# Patient Record
Sex: FEMALE | Race: WHITE | NOT HISPANIC OR LATINO | URBAN - METROPOLITAN AREA
[De-identification: names, ages, dates, MRNs, and addresses within clinical notes are randomized per-mention and may not be internally consistent; named-entity substitution may affect disease eponyms.]

---

## 2024-10-29 ENCOUNTER — CLINICAL SUPPORT (OUTPATIENT)
Dept: AUDIOLOGY | Facility: CLINIC | Age: 24
End: 2024-10-29
Payer: COMMERCIAL

## 2024-10-29 ENCOUNTER — APPOINTMENT (OUTPATIENT)
Dept: OTOLARYNGOLOGY | Facility: CLINIC | Age: 24
End: 2024-10-29
Payer: COMMERCIAL

## 2024-10-29 VITALS — HEIGHT: 63 IN | WEIGHT: 130 LBS | BODY MASS INDEX: 23.04 KG/M2

## 2024-10-29 DIAGNOSIS — R42 DIZZINESS: Primary | ICD-10-CM

## 2024-10-29 DIAGNOSIS — R42 DIZZINESS AND GIDDINESS: Primary | ICD-10-CM

## 2024-10-29 DIAGNOSIS — H93.13 TINNITUS OF BOTH EARS: ICD-10-CM

## 2024-10-29 PROCEDURE — 92550 TYMPANOMETRY & REFLEX THRESH: CPT | Performed by: AUDIOLOGIST

## 2024-10-29 PROCEDURE — 92557 COMPREHENSIVE HEARING TEST: CPT | Performed by: AUDIOLOGIST

## 2024-10-29 PROCEDURE — 3008F BODY MASS INDEX DOCD: CPT | Performed by: PHYSICIAN ASSISTANT

## 2024-10-29 PROCEDURE — 1036F TOBACCO NON-USER: CPT | Performed by: PHYSICIAN ASSISTANT

## 2024-10-29 PROCEDURE — 99203 OFFICE O/P NEW LOW 30 MIN: CPT | Performed by: PHYSICIAN ASSISTANT

## 2024-10-29 RX ORDER — OLANZAPINE 2.5 MG/1
TABLET ORAL
COMMUNITY
Start: 2024-10-16

## 2024-10-29 RX ORDER — FLUOXETINE 10 MG/1
20 CAPSULE ORAL DAILY
COMMUNITY
Start: 2024-10-15

## 2024-10-29 RX ORDER — OMEPRAZOLE 40 MG/1
CAPSULE, DELAYED RELEASE ORAL
COMMUNITY
Start: 2024-10-25

## 2024-10-29 RX ORDER — PROMETHAZINE HYDROCHLORIDE 12.5 MG/1
TABLET ORAL
COMMUNITY
Start: 2024-10-28

## 2024-11-13 NOTE — PROGRESS NOTES
ADULT BALANCE FUNCTION TEST (BFT)      Name:  Abigail Martínez  :  2000  Age:  24 y.o.  Date of Evaluation:  2024    IMPRESSIONS     Overall normal vestibular examination; no evidence of active vestibular system involvement to account for patient reported symptoms. There were no indications of peripheral or central vestibular system pathway involvement. There were no indications of active Benign Paroxysmal Positional Vertigo (BPPV) present. However, patient is at risk of falling based on previous history of imbalance with associated falls.     RECOMMENDATIONS     Consider vestibular physical therapy to address reported history of imbalance with associated falls. Patient would be an appropriate candidate for fall risk prevention.  Consider re-evaluation as medically indicated.  Maintain a healthy lifestyle to help body function overall.  Continue monitoring per ENT/PCP preference.    Time: 9704-9802    HISTORY     Patient was seen for Balance Function Testing (BFT) due to a history of dizziness/imbalance. Vestibular case history collected via patient-clinician interview, patient chart review, and patient questionnaires.    Patient reported history of dizziness described as vertigo/spinning.  Symptoms began roughly two years ago. Notes might have had similar symptoms in the past, however they occurred sporadically and were less consistent in nature.  Episodes occur daily and last several minutes (10-15) before subsiding.  Associated symptoms include imbalance (veering from side to side), swaying, rocking sensation, and intermittent lightheadedness.  Symptoms are provoked by standing quickly, rolling over in bed, quick head turns, and quick changes in vision (glancing side to side).  Symptoms are unable to be alleviated. Patient has utilized Meclizine without improvement in symptoms.  Overall the patient's symptoms have increased in severity over time. Of note, patient has participated in vestibular  "physical therapy with focus on gaze stability and gait/balance exercises (most recent session one year ago, attended for several months). Patient did not perceive improvement in symptoms following treatment.   This patient has had a several falls due to their symptoms with several concussions.  Denied any symptoms provoked by sneezing or coughing, as well as any presence of autophony.   Recent medication changes include phychiatric medications.  Relevant medical history includes intermittent tinnitus and Polycystic Ovary Syndrome (PCOS). Consistent left leg numbness since July 2024.  It should be noted patient attended today's evaluation from an inpatient facility for phychiatric disorders. She is currently under treatment at [facility].  Most recent audiologic evaluation performed on 10/29/2024 by Shirin Benedict CCC-MAX revealed normal hearing sensitivity, bilaterally. Tympanometry revealed normal eardrum mobility and canal volume, bilaterally.  Patient reported complying with pre-test instructions.     EVALUATION     See VNG, vHIT, & VEMP Raw Data in \"Media\"    TEST RESULTS     BEDSIDE ASSESSMENT TEST  The bedside assessment is an optional portion of the test battery to further assist in differential diagnosis and screen for eye abnormalities which may affect testing.    Otoscopy: clear ear canals.  Extra-Ocular Range of Motion: normal.Extra-Ocular Range of Motion is performed to evaluate any eye abnormalities prior to testing.  Cover/Uncover: normal. Cover/Uncover test is performed to evaluate for skewed deviation of the eyes prior to testing.  Cervical Neck Exam: normal. Cervical Neck is performed to evaluate any restrictions or pain with neck movement prior to testing.  Vertebral Artery Screen: normal. Vertebral Artery Screen is performed to evaluate for vertebrobasilar insufficiency prior to testing.   Ocular Counter-Roll: normal. Ocular Counter-Roll is performed to evaluate ocular tilt reaction and " assess otolith organ function prior to testing.  VOR Cancellation: normal. VOR Cancellation is performed to evaluate fixation suppression prior to testing.  Modified Clinical Test of Sensory Interaction on Balance (mCTSIB): deferred on this date due to parent reported concerns of falls. mCTSIB is performed to evaluate balance with varying sensory information.      VIDEONYSTAGMOGRAPHY (VNG) TEST  VNG provides objective indications of peripheral and central vestibulo-ocular pathway involvement. Ocular motor testing to visually guided targets is conducted using a dual channel video-recording technique for the recording of eye movement in the horizontal and vertical planes. Air caloric testing is performed at 48 degrees C and 24 degrees C.    Spontaneous Nystagmus test was absent. Spontaneous nystagmus testing may help with the identification of an acute or uncompensated peripheral vestibular lesion.   Gaze Nystagmus test was normal. Gaze nystagmus testing is to evaluate for nystagmus that is evoked by holding eye gaze in any particular direction. True gaze nystagmus is amplified when vision is denied.   Random Saccades test was normal. Random saccade testing is to evaluate patient's ability to make fast random eye movements along a horizontal moving target.   Smooth Pursuit/Tracking test was normal. Smooth pursuit/tracking testing is to evaluate the ability to move eyes with a single smoothly moving target.   Optokinetic nystagmus testing was normal. This full-field OPK test is to evaluate the ability of central nervous system to stabilize vision during sustained head movement after the VOR system loses effectiveness.   Clarkston-Hallpike testing was normal. Zion-Hallpike testing is to provide a diagnosis of Benign Paroxysmal Positional Vertigo (BPPV) of the vertical semicircular canals on the side which is most affected.  Roll testing was normal. Roll testing is to provide a diagnosis of Benign Paroxysmal Positional Vertigo  (BPPV) of the horizontal semicircular canals on the side which is most affected.  Positional testing was normal. Positional testing is to evaluate patient's ability to hold a steady gaze while in different positions.  Monothermal caloric testing was normal. Unilateral weakness of 6% to the right which is normal. Caloric testing is to evaluate for peripheral vestibular lesion.    NOTE: monothermal caloric testing is indicated when:  slow phase velocity of the nystagmus is 8 degrees/second or greater  less than 15% difference in the degree of nystagmus between the ears  spontaneous or positional nystagmus observed during testing is less than 5 degrees/second      VIDEO HEAD IMPULSE TEST (vHIT)  The vHIT procedure provides objective assessment of the high frequency vestibulo-ocular reflex (VOR) for each semicircular canal. Rapid, random horizontal and vertical thrusts are applied to the patient's head to provoke the VOR. The vHIT procedure includes two separate paradigms: Head Impulse Paradigm (HIMP) and Suppression Head Impulse Paradigm (SHIMP). SHIMP is an optional paradigm that is not appropriate to perform for every patient. However, it is appropriate to perform SHIMP when there is verified evidence of possible vestibulopathy in the traditional HIMP test.     Head Impulse Paradigm (HIMP)   Right Ear   Canal Gain Overt Saccades Covert Saccades   Lateral 0.84 absent absent   Anterior 0.92 absent absent   Posterior 0.74 absent absent        Head Impulse Paradigm (HIMP)   Left Ear   Canal Gain Overt Saccades Covert Saccades   Lateral 0.85 absent absent   Anterior 0.80 absent absent   Posterior 0.80 absent absent     Total gain for all canals tested was within normal limits  (<0.80 is abnormal for lateral, <0.70 is abnormal for vertical).  There was no evidence of overt or covert saccades throughout testing.      CERVICAL VESTIBULAR EVOKED MYOGENIC POTENTIALS (cVEMP)  The cVEMP procedure is an evoked potential used to  test the saccule and its afferent pathway. An asymmetry ratio is utilized to determine side of lesion. The cVEMP was recorded with the patient cervical extension to produce isolated contraction of the ipsilateral sternocleidomastoid (SCM) muscle. The cVEMP was recorded using a 500 Hz tone burst or 1000 Hz tone burst at a rate of 5.1.      Ear Presentation Level Amplitude P1 Latency N1 Latency  Amplitude Asymmetry Ratio   Right 95 dB nHL 53.03 µV 16.00 ms 23.00 ms 21%   Left 95 dB nHL 80.79 µV 14.00 ms 22.00 ms       Superior Canal Dehiscence Screening (75 dB nHL): Negative bilaterally.    Replicable cVEMP responses were within normal limits, bilaterally. The amplitude asymmetry ratio of 21% was not significant (>33% = abnormal)        OCULAR VESTIBULAR EVOKED MYOGENIC POTENTIALS (oVEMP)  The oVEMP procedure is an evoked potential used to test the utricle and its afferent pathway. An asymmetry ratio is utilized to determine side of lesion. This is a contralateral recording. The oVEMP was recorded with the patient seated upright with eyes tilted upward to produce isolated contraction of the contralateral inferior oblique muscle. The oVEMP were recorded using a 500 Hz, 1000 Hz, 4000 Hz tone burst at a rate of 5.1.       Ear Presentation Level Amplitude N1 Latency  P1 Latency  Amplitude Asymmetry Ratio   Right 105 dB nHL 11.49 µV 10.67 ms 17.33 ms Could not calculate due to threshold difference.   Left 95 dB nHL 12.06 µV 10.67 ms 15.33 ms       Meniere's Disease Screening (1000 Hz): Present responses however smaller amplitude than response at 500 Hz, therefore not positive screener or clinically significant finding. Overall considered negative, bilaterally.  Superior Canal Dehiscence Screening (4000 Hz): Negative bilaterally.    Replicable oVEMP responses were within or borderline within normal limits, bilaterally.  The amplitude asymmetry ratio could not be calculated.    Raw data reviewed by a member of the Vestibular  & Balance Disorders team. Testing and interpretation of results completed by SHIRIN Grover, CCC-A. It was my pleasure to evaluate this patient.     Additional Attendees: Patient's mother (intermittently)    Shirin Grover, CCC-A  Senior Clinical Vestibular Audiologist

## 2024-11-19 ENCOUNTER — CLINICAL SUPPORT (OUTPATIENT)
Dept: AUDIOLOGY | Facility: CLINIC | Age: 24
End: 2024-11-19
Payer: COMMERCIAL

## 2024-11-19 DIAGNOSIS — R42 DIZZINESS AND GIDDINESS: Primary | ICD-10-CM

## 2024-11-19 PROCEDURE — 92700 UNLISTED ORL SERVICE/PX: CPT

## 2024-11-19 PROCEDURE — 92519 VEMP TST I&R CERVICAL&OCULAR: CPT

## 2024-11-19 PROCEDURE — 92540 BASIC VESTIBULAR EVALUATION: CPT

## 2024-11-19 PROCEDURE — 92538 CALORIC VSTBLR TEST W/REC: CPT

## 2024-11-19 NOTE — PATIENT INSTRUCTIONS
BALANCE FUNCTION TEST (BFT)  AFTER VISIT SUMMARY      TESTING SUMMARY     The purpose of today's testing was to evaluate for any vestibular system (inner ear) involvement to account for your symptoms of dizziness/imbalance. Deep inside each of your ears, there are 5 balance organs which contribute to your ability to maintain balance and reduce dizziness. Our vestibular system involves 3 semicircular canals (“spinning detectors”) and 2 otolith organs (“gravity sensors”).    IMPRESSIONS     Based on today's evaluation, your vestibular system appears to be normal and functioning as expected. Overall, there is a low likelihood of the inner ear as a source for your symptoms.     RECOMMENDATIONS     Continue medical follow up with Dr. Gaytan.   Consider vestibular physical therapy to address reported history of imbalance with associated falls. Patient would be an appropriate candidate for fall risk prevention.  Consider further evaluation into non-vestibular conditions to account for symptoms.  Consider re-evaluation as medically indicated.  Maintain a healthy lifestyle to help body function overall.    Testing and interpretation of results completed by Shirin Grover CCC-A. It was my pleasure to evaluate this patient.       Shirin Grover, CCC-A  Senior Clinical Vestibular Audiologist

## 2024-11-19 NOTE — Clinical Note
Should you proceed with vestibular physical therapy for fall risk prevention, consider inpatient options for patient's current program ([facility]).

## 2024-11-20 ENCOUNTER — OFFICE VISIT (OUTPATIENT)
Dept: OTOLARYNGOLOGY | Facility: CLINIC | Age: 24
End: 2024-11-20
Payer: COMMERCIAL

## 2024-11-20 VITALS — BODY MASS INDEX: 23.92 KG/M2 | HEIGHT: 63 IN | WEIGHT: 135 LBS

## 2024-11-20 DIAGNOSIS — R26.89 BALANCE DISORDER: Primary | ICD-10-CM

## 2024-11-20 PROCEDURE — 99212 OFFICE O/P EST SF 10 MIN: CPT | Performed by: PHYSICIAN ASSISTANT

## 2024-11-20 PROCEDURE — 3008F BODY MASS INDEX DOCD: CPT | Performed by: PHYSICIAN ASSISTANT

## 2024-11-20 PROCEDURE — 1036F TOBACCO NON-USER: CPT | Performed by: PHYSICIAN ASSISTANT

## 2024-11-20 RX ORDER — ACETYLCYSTEINE 600 MG
1200 CAPSULE ORAL 2 TIMES DAILY
COMMUNITY
Start: 2024-11-15 | End: 2024-12-15

## 2024-11-20 NOTE — PROGRESS NOTES
Abigail Martínez is a 24 y.o. year old female patient with Results     Patient returns to the office today for follow-up on balance disorder.  VNG was performed showing essentially normal vestibular function with no evidence of peripheral or central findings.  Patient still experiencing dizziness and imbalance and presents with her mother today again in wheelchair.  She has not yet seen neurology to this point and did have previous vestibular therapy with Epley maneuver which was unsuccessful at treating the patient's symptoms.  She returns today to discuss results.  All other ENT issues are negative.  There have been no significant changes in past medical or past surgical histories except as mentioned.          Physical Exam:   General appearance: No acute distress. Normal facies. Symmetric facial movement. No gross lesions of the face are noted.  The external ear structures appear normal. The ear canals patent and the tympanic membranes are intact without evidence of air-fluid levels, retraction, or congenital defects.  Anterior rhinoscopy notes essentially a midline nasal septum. Examination is noted for normal healthy mucosal membranes without any evidence of lesions, polyps, or exudate. The tongue is normally mobile. There are no lesions on the gingiva, buccal, or oral mucosa. There are no oral cavity masses.  The neck is negative for mass lymphadenopathy. The trachea and parotid are clear. The thyroid bed is grossly unremarkable. The salivary gland structures are grossly unremarkable.    Assessment/Plan   1.  Dizziness  2.  Balance disorder    Patient seen in the office today for ongoing dizziness and imbalance.  VNG did not show any evidence of peripheral involvement or central abnormal findings.  The patient at this time is going to be given referral to our colleagues in neurology for further assessment as well as physical therapy for general balance and gait training as well as strengthening.  The patient does  have history of eating disorder and certainly malnutrition may be a component to the patient's weakness fatigue and imbalance concerns.  She is currently enrolled in a program staying at a residence working on recovery.    I will see her back as needed.    All questions and concerns were answered and addressed. The patient expresses understanding and will follow up as advised.    Thank you again for allowing us to participate in the care of this patient.

## 2025-02-28 ENCOUNTER — APPOINTMENT (OUTPATIENT)
Dept: NEUROLOGY | Facility: HOSPITAL | Age: 25
End: 2025-02-28
Payer: COMMERCIAL